# Patient Record
Sex: MALE | NOT HISPANIC OR LATINO | URBAN - METROPOLITAN AREA
[De-identification: names, ages, dates, MRNs, and addresses within clinical notes are randomized per-mention and may not be internally consistent; named-entity substitution may affect disease eponyms.]

---

## 2017-01-25 ENCOUNTER — INPATIENT (INPATIENT)
Facility: HOSPITAL | Age: 52
LOS: 0 days | Discharge: LEFT BEFORE DISCHARGE | DRG: 287 | End: 2017-01-26
Attending: INTERNAL MEDICINE | Admitting: INTERNAL MEDICINE
Payer: MEDICAID

## 2017-01-25 VITALS
SYSTOLIC BLOOD PRESSURE: 189 MMHG | DIASTOLIC BLOOD PRESSURE: 94 MMHG | WEIGHT: 248.9 LBS | TEMPERATURE: 98 F | OXYGEN SATURATION: 100 % | HEART RATE: 95 BPM | RESPIRATION RATE: 19 BRPM | HEIGHT: 68 IN

## 2017-01-25 PROCEDURE — 93010 ELECTROCARDIOGRAM REPORT: CPT

## 2017-01-25 PROCEDURE — 71020: CPT | Mod: 26

## 2017-01-25 PROCEDURE — 99285 EMERGENCY DEPT VISIT HI MDM: CPT | Mod: 25

## 2017-01-25 RX ORDER — CLOPIDOGREL BISULFATE 75 MG/1
600 TABLET, FILM COATED ORAL ONCE
Qty: 0 | Refills: 0 | Status: COMPLETED | OUTPATIENT
Start: 2017-01-25 | End: 2017-01-25

## 2017-01-25 RX ORDER — ASPIRIN/CALCIUM CARB/MAGNESIUM 324 MG
325 TABLET ORAL ONCE
Qty: 0 | Refills: 0 | Status: COMPLETED | OUTPATIENT
Start: 2017-01-25 | End: 2017-01-25

## 2017-01-25 RX ORDER — SODIUM CHLORIDE 9 MG/ML
1000 INJECTION INTRAMUSCULAR; INTRAVENOUS; SUBCUTANEOUS
Qty: 0 | Refills: 0 | Status: DISCONTINUED | OUTPATIENT
Start: 2017-01-25 | End: 2017-01-26

## 2017-01-25 RX ADMIN — CLOPIDOGREL BISULFATE 600 MILLIGRAM(S): 75 TABLET, FILM COATED ORAL at 23:51

## 2017-01-25 RX ADMIN — Medication 325 MILLIGRAM(S): at 23:51

## 2017-01-25 RX ADMIN — SODIUM CHLORIDE 75 MILLILITER(S): 9 INJECTION INTRAMUSCULAR; INTRAVENOUS; SUBCUTANEOUS at 23:52

## 2017-01-25 NOTE — ED ADULT TRIAGE NOTE - CHIEF COMPLAINT QUOTE
as per pt he has been having mid sternal  Chest pain since yesterday. pt stated that the chest pain is accompanied by nausea

## 2017-01-25 NOTE — ED ADULT NURSE NOTE - OBJECTIVE STATEMENT
52 y/o male with hx of HTN, DM, and hyperlipidemia arrived to [facility] ER reporting chest pain radiating to left arm intermittently for the past several weeks. Pt verbalized experiencing periods of nausea but no vomiting and diarrhea. Pt denies headache, blurred vision , slurred speech, palpitations, vomiting, diarrhea, fever, chills, LOC, SOB, weakness, fatigue, recent travel, recent injury. Upon assessment, abdomen soft and nontender, lung fields WNL, breathing is equal and unlabored, pulses palpable. EKG monitoring in progress. Awaiting disposition

## 2017-01-25 NOTE — ED PROVIDER NOTE - PROGRESS NOTE DETAILS
d/w primary cardiologist Dr. Hall [phone], abnormal exercise stress test yesterday w/ ST depression in II, III, aVF, 4, 5, ~10 min to return to baseline d/w Dr. Malloy, agree pt needs further cardiac care, will admit, asa 325 and plavix 600, admit for further evaluation/management

## 2017-01-25 NOTE — ED PROVIDER NOTE - PHYSICAL EXAMINATION
CON: ao x 3, HENMT: clear oropharynx, soft neck, HEAD: atraumatic, CV: rrr, equal pulses b/l, RESP: cta b/l, GI: +BS, soft, nontender, no rebound, no guarding, SKIN: no rash, MSK: moving all extremities spontaneously, NEURO: nonfocal

## 2017-01-25 NOTE — ED PROVIDER NOTE - MEDICAL DECISION MAKING DETAILS
intermittent cp, dm/htn/hl, smoker, after discussing w/ primary cardiology, found to have abnormal stress test, will d/w oncall cardiology

## 2017-01-25 NOTE — ED PROVIDER NOTE - OBJECTIVE STATEMENT
51 yom pw intermittent cp, across ant chest, ~1-2 min, rad to L arm, nonexertional, no modifying factors, no prior CAD.  last episode of cp 30 min PTA.

## 2017-01-26 VITALS
SYSTOLIC BLOOD PRESSURE: 168 MMHG | RESPIRATION RATE: 22 BRPM | TEMPERATURE: 98 F | OXYGEN SATURATION: 95 % | DIASTOLIC BLOOD PRESSURE: 90 MMHG

## 2017-01-26 DIAGNOSIS — R07.9 CHEST PAIN, UNSPECIFIED: ICD-10-CM

## 2017-01-26 DIAGNOSIS — E11.9 TYPE 2 DIABETES MELLITUS WITHOUT COMPLICATIONS: ICD-10-CM

## 2017-01-26 DIAGNOSIS — Z29.9 ENCOUNTER FOR PROPHYLACTIC MEASURES, UNSPECIFIED: ICD-10-CM

## 2017-01-26 DIAGNOSIS — E78.5 HYPERLIPIDEMIA, UNSPECIFIED: ICD-10-CM

## 2017-01-26 DIAGNOSIS — G47.33 OBSTRUCTIVE SLEEP APNEA (ADULT) (PEDIATRIC): ICD-10-CM

## 2017-01-26 DIAGNOSIS — Z41.8 ENCOUNTER FOR OTHER PROCEDURES FOR PURPOSES OTHER THAN REMEDYING HEALTH STATE: ICD-10-CM

## 2017-01-26 DIAGNOSIS — E78.2 MIXED HYPERLIPIDEMIA: ICD-10-CM

## 2017-01-26 DIAGNOSIS — I10 ESSENTIAL (PRIMARY) HYPERTENSION: ICD-10-CM

## 2017-01-26 DIAGNOSIS — E11.8 TYPE 2 DIABETES MELLITUS WITH UNSPECIFIED COMPLICATIONS: ICD-10-CM

## 2017-01-26 DIAGNOSIS — R63.8 OTHER SYMPTOMS AND SIGNS CONCERNING FOOD AND FLUID INTAKE: ICD-10-CM

## 2017-01-26 DIAGNOSIS — I24.9 ACUTE ISCHEMIC HEART DISEASE, UNSPECIFIED: ICD-10-CM

## 2017-01-26 LAB
ALBUMIN SERPL ELPH-MCNC: 3.6 G/DL — SIGNIFICANT CHANGE UP (ref 3.4–5)
ALP SERPL-CCNC: 91 U/L — SIGNIFICANT CHANGE UP (ref 40–120)
ALT FLD-CCNC: 31 U/L — SIGNIFICANT CHANGE UP (ref 12–42)
ANION GAP SERPL CALC-SCNC: 7 MMOL/L — LOW (ref 9–16)
APTT BLD: 37.3 SEC — SIGNIFICANT CHANGE UP (ref 27.5–37.4)
APTT BLD: 43.2 SEC — HIGH (ref 27.5–37.4)
AST SERPL-CCNC: 15 U/L — SIGNIFICANT CHANGE UP (ref 15–37)
BILIRUB SERPL-MCNC: 0.7 MG/DL — SIGNIFICANT CHANGE UP (ref 0.2–1.2)
BLD GP AB SCN SERPL QL: NEGATIVE — SIGNIFICANT CHANGE UP
BUN SERPL-MCNC: 17 MG/DL — SIGNIFICANT CHANGE UP (ref 7–23)
CALCIUM SERPL-MCNC: 8.6 MG/DL — SIGNIFICANT CHANGE UP (ref 8.5–10.5)
CHLORIDE SERPL-SCNC: 104 MMOL/L — SIGNIFICANT CHANGE UP (ref 96–108)
CHOLEST SERPL-MCNC: 207 MG/DL — HIGH
CK MB CFR SERPL CALC: 1.2 NG/ML — SIGNIFICANT CHANGE UP (ref 0.5–3.6)
CK MB CFR SERPL CALC: 1.4 NG/ML — SIGNIFICANT CHANGE UP (ref 0.5–3.6)
CK SERPL-CCNC: 138 U/L — SIGNIFICANT CHANGE UP (ref 39–308)
CK SERPL-CCNC: 147 U/L — SIGNIFICANT CHANGE UP (ref 39–308)
CO2 SERPL-SCNC: 27 MMOL/L — SIGNIFICANT CHANGE UP (ref 22–31)
CREAT SERPL-MCNC: 0.69 MG/DL — SIGNIFICANT CHANGE UP (ref 0.5–1.3)
GLUCOSE SERPL-MCNC: 208 MG/DL — HIGH (ref 70–99)
HBA1C BLD-MCNC: 8.3 % — HIGH (ref 4.8–5.6)
HCT VFR BLD CALC: 42.2 % — SIGNIFICANT CHANGE UP (ref 39–50)
HCV AB S/CO SERPL IA: 0.2 S/CO — SIGNIFICANT CHANGE UP
HCV AB SERPL-IMP: SIGNIFICANT CHANGE UP
HDLC SERPL-MCNC: 29 MG/DL — LOW
HGB BLD-MCNC: 14.8 G/DL — SIGNIFICANT CHANGE UP (ref 13–17)
INR BLD: 1.02 — SIGNIFICANT CHANGE UP (ref 0.88–1.16)
LIPID PNL WITH DIRECT LDL SERPL: 111 MG/DL — HIGH
MAGNESIUM SERPL-MCNC: 1.8 MG/DL — SIGNIFICANT CHANGE UP (ref 1.6–2.4)
MCHC RBC-ENTMCNC: 30 PG — SIGNIFICANT CHANGE UP (ref 27–34)
MCHC RBC-ENTMCNC: 35.1 G/DL — SIGNIFICANT CHANGE UP (ref 32–36)
MCV RBC AUTO: 85.6 FL — SIGNIFICANT CHANGE UP (ref 80–100)
PLATELET # BLD AUTO: 216 K/UL — SIGNIFICANT CHANGE UP (ref 150–400)
POTASSIUM SERPL-MCNC: 3.7 MMOL/L — SIGNIFICANT CHANGE UP (ref 3.5–5.3)
POTASSIUM SERPL-SCNC: 3.7 MMOL/L — SIGNIFICANT CHANGE UP (ref 3.5–5.3)
PROT SERPL-MCNC: 7.3 G/DL — SIGNIFICANT CHANGE UP (ref 6.4–8.2)
PROTHROM AB SERPL-ACNC: 11.3 SEC — SIGNIFICANT CHANGE UP (ref 10–13.1)
RBC # BLD: 4.93 M/UL — SIGNIFICANT CHANGE UP (ref 4.2–5.8)
RBC # FLD: 12.6 % — SIGNIFICANT CHANGE UP (ref 10.3–16.9)
RH IG SCN BLD-IMP: POSITIVE — SIGNIFICANT CHANGE UP
SODIUM SERPL-SCNC: 138 MMOL/L — SIGNIFICANT CHANGE UP (ref 135–145)
TOTAL CHOLESTEROL/HDL RATIO MEASUREMENT: 7.1 RATIO — HIGH
TRIGL SERPL-MCNC: 335 MG/DL — HIGH
TROPONIN I SERPL-MCNC: <0.015 NG/ML — SIGNIFICANT CHANGE UP (ref 0.01–0.04)
TROPONIN I SERPL-MCNC: <0.015 NG/ML — SIGNIFICANT CHANGE UP (ref 0.01–0.04)
TSH SERPL-MCNC: 1.41 UIU/ML — SIGNIFICANT CHANGE UP (ref 0.35–4.94)
WBC # BLD: 5.9 K/UL — SIGNIFICANT CHANGE UP (ref 3.8–10.5)
WBC # FLD AUTO: 5.9 K/UL — SIGNIFICANT CHANGE UP (ref 3.8–10.5)

## 2017-01-26 PROCEDURE — 93010 ELECTROCARDIOGRAM REPORT: CPT

## 2017-01-26 PROCEDURE — 84484 ASSAY OF TROPONIN QUANT: CPT

## 2017-01-26 PROCEDURE — C1769: CPT

## 2017-01-26 PROCEDURE — 83735 ASSAY OF MAGNESIUM: CPT

## 2017-01-26 PROCEDURE — 99285 EMERGENCY DEPT VISIT HI MDM: CPT | Mod: 25

## 2017-01-26 PROCEDURE — C1887: CPT

## 2017-01-26 PROCEDURE — 71046 X-RAY EXAM CHEST 2 VIEWS: CPT

## 2017-01-26 PROCEDURE — 86901 BLOOD TYPING SEROLOGIC RH(D): CPT

## 2017-01-26 PROCEDURE — 85610 PROTHROMBIN TIME: CPT

## 2017-01-26 PROCEDURE — 86850 RBC ANTIBODY SCREEN: CPT

## 2017-01-26 PROCEDURE — 93458 L HRT ARTERY/VENTRICLE ANGIO: CPT | Mod: 26

## 2017-01-26 PROCEDURE — 93005 ELECTROCARDIOGRAM TRACING: CPT

## 2017-01-26 PROCEDURE — 85025 COMPLETE CBC W/AUTO DIFF WBC: CPT

## 2017-01-26 PROCEDURE — 84443 ASSAY THYROID STIM HORMONE: CPT

## 2017-01-26 PROCEDURE — 86803 HEPATITIS C AB TEST: CPT

## 2017-01-26 PROCEDURE — 36415 COLL VENOUS BLD VENIPUNCTURE: CPT

## 2017-01-26 PROCEDURE — 82550 ASSAY OF CK (CPK): CPT

## 2017-01-26 PROCEDURE — 80061 LIPID PANEL: CPT

## 2017-01-26 PROCEDURE — 86900 BLOOD TYPING SEROLOGIC ABO: CPT

## 2017-01-26 PROCEDURE — 80053 COMPREHEN METABOLIC PANEL: CPT

## 2017-01-26 PROCEDURE — 82553 CREATINE MB FRACTION: CPT

## 2017-01-26 PROCEDURE — 85027 COMPLETE CBC AUTOMATED: CPT

## 2017-01-26 PROCEDURE — 85730 THROMBOPLASTIN TIME PARTIAL: CPT

## 2017-01-26 PROCEDURE — 83036 HEMOGLOBIN GLYCOSYLATED A1C: CPT

## 2017-01-26 RX ORDER — AMLODIPINE BESYLATE 2.5 MG/1
5 TABLET ORAL DAILY
Qty: 0 | Refills: 0 | Status: DISCONTINUED | OUTPATIENT
Start: 2017-01-26 | End: 2017-01-26

## 2017-01-26 RX ORDER — AMLODIPINE BESYLATE 2.5 MG/1
1 TABLET ORAL
Qty: 0 | Refills: 0 | COMMUNITY

## 2017-01-26 RX ORDER — CLOPIDOGREL BISULFATE 75 MG/1
75 TABLET, FILM COATED ORAL DAILY
Qty: 0 | Refills: 0 | Status: DISCONTINUED | OUTPATIENT
Start: 2017-01-26 | End: 2017-01-26

## 2017-01-26 RX ORDER — HEPARIN SODIUM 5000 [USP'U]/ML
1000 INJECTION INTRAVENOUS; SUBCUTANEOUS
Qty: 25000 | Refills: 0 | Status: DISCONTINUED | OUTPATIENT
Start: 2017-01-26 | End: 2017-01-26

## 2017-01-26 RX ORDER — ATORVASTATIN CALCIUM 80 MG/1
80 TABLET, FILM COATED ORAL AT BEDTIME
Qty: 0 | Refills: 0 | Status: DISCONTINUED | OUTPATIENT
Start: 2017-01-26 | End: 2017-01-26

## 2017-01-26 RX ORDER — HEPARIN SODIUM 5000 [USP'U]/ML
INJECTION INTRAVENOUS; SUBCUTANEOUS
Qty: 25000 | Refills: 0 | Status: DISCONTINUED | OUTPATIENT
Start: 2017-01-26 | End: 2017-01-26

## 2017-01-26 RX ORDER — FINASTERIDE 5 MG/1
5 TABLET, FILM COATED ORAL DAILY
Qty: 0 | Refills: 0 | Status: DISCONTINUED | OUTPATIENT
Start: 2017-01-26 | End: 2017-01-26

## 2017-01-26 RX ORDER — METOPROLOL TARTRATE 50 MG
12.5 TABLET ORAL EVERY 12 HOURS
Qty: 0 | Refills: 0 | Status: DISCONTINUED | OUTPATIENT
Start: 2017-01-26 | End: 2017-01-26

## 2017-01-26 RX ORDER — POTASSIUM CHLORIDE 20 MEQ
40 PACKET (EA) ORAL ONCE
Qty: 0 | Refills: 0 | Status: COMPLETED | OUTPATIENT
Start: 2017-01-26 | End: 2017-01-26

## 2017-01-26 RX ORDER — ASPIRIN/CALCIUM CARB/MAGNESIUM 324 MG
81 TABLET ORAL DAILY
Qty: 0 | Refills: 0 | Status: DISCONTINUED | OUTPATIENT
Start: 2017-01-26 | End: 2017-01-26

## 2017-01-26 RX ORDER — SIMVASTATIN 20 MG/1
1 TABLET, FILM COATED ORAL
Qty: 0 | Refills: 0 | COMMUNITY

## 2017-01-26 RX ORDER — METFORMIN HYDROCHLORIDE 850 MG/1
1 TABLET ORAL
Qty: 0 | Refills: 0 | COMMUNITY

## 2017-01-26 RX ORDER — AMLODIPINE BESYLATE 2.5 MG/1
5 TABLET ORAL ONCE
Qty: 0 | Refills: 0 | Status: COMPLETED | OUTPATIENT
Start: 2017-01-26 | End: 2017-01-26

## 2017-01-26 RX ORDER — LOSARTAN POTASSIUM 100 MG/1
100 TABLET, FILM COATED ORAL DAILY
Qty: 0 | Refills: 0 | Status: DISCONTINUED | OUTPATIENT
Start: 2017-01-26 | End: 2017-01-26

## 2017-01-26 RX ORDER — ASPIRIN/CALCIUM CARB/MAGNESIUM 324 MG
1 TABLET ORAL
Qty: 0 | Refills: 0 | COMMUNITY

## 2017-01-26 RX ORDER — FINASTERIDE 5 MG/1
1 TABLET, FILM COATED ORAL
Qty: 0 | Refills: 0 | COMMUNITY

## 2017-01-26 RX ORDER — INSULIN LISPRO 100/ML
VIAL (ML) SUBCUTANEOUS
Qty: 0 | Refills: 0 | Status: DISCONTINUED | OUTPATIENT
Start: 2017-01-26 | End: 2017-01-26

## 2017-01-26 RX ADMIN — Medication 40 MILLIEQUIVALENT(S): at 01:58

## 2017-01-26 RX ADMIN — Medication 2: at 13:35

## 2017-01-26 RX ADMIN — Medication 12.5 MILLIGRAM(S): at 14:50

## 2017-01-26 RX ADMIN — LOSARTAN POTASSIUM 100 MILLIGRAM(S): 100 TABLET, FILM COATED ORAL at 01:57

## 2017-01-26 RX ADMIN — CLOPIDOGREL BISULFATE 75 MILLIGRAM(S): 75 TABLET, FILM COATED ORAL at 15:28

## 2017-01-26 RX ADMIN — AMLODIPINE BESYLATE 5 MILLIGRAM(S): 2.5 TABLET ORAL at 00:12

## 2017-01-26 RX ADMIN — Medication 4: at 15:37

## 2017-01-26 RX ADMIN — Medication 81 MILLIGRAM(S): at 13:35

## 2017-01-26 RX ADMIN — FINASTERIDE 5 MILLIGRAM(S): 5 TABLET, FILM COATED ORAL at 13:34

## 2017-01-26 RX ADMIN — Medication 40 MILLIEQUIVALENT(S): at 09:04

## 2017-01-26 RX ADMIN — Medication 4: at 07:59

## 2017-01-26 NOTE — PROGRESS NOTE ADULT - ASSESSMENT
51M with PMHx of HTN, HLD, DMII, JACUQELINE, heavy smoker presenting with symptoms consistent with unstable angina with new onset chest pain at rest and exertion, significant risk factors for CAD, positive stress test as outpatient.

## 2017-01-26 NOTE — H&P ADULT. - PROBLEM SELECTOR PLAN 3
Home medications: Hyzaar 100mg/12.5mg, Norvasc 5mg  Hypertensive on admission to systolic 170s-190s, no signs of end organ damage at this time- s/p Norvasc 5mg in the ED  Will give home medication of Losartan 100mg  Monitor BP

## 2017-01-26 NOTE — CHART NOTE - NSCHARTNOTEFT_GEN_A_CORE
Called by Nurse at 6:30PM that patient was angry and was leaving the hospital. Went to speak with patient who reported that he had previously asked the nurse for a cup of tea which he never received and therefore he was leaving. The AMA discharge was completed and the patient left the hospital. He calmed down downstairs and then returned to the unit. Called by Nurse at 6:30PM that patient was angry and was leaving the hospital. Went to speak with patient who reported that he had previously asked the nurse for a cup of tea which he never received and therefore he was leaving. The AMA discharge was completed and the patient left the hospital. He calmed down downstairs and his mother returned to the unit to ask if he could come back to his room. Dr. Malloy was contacted and explained that if he left the hospital AMA then he would have to be readmitted through the ED. The family left and the patient did not return.

## 2017-01-26 NOTE — H&P ADULT. - FAMILY HISTORY
Sibling  Still living? Unknown  Family history of MI (myocardial infarction), Age at diagnosis: Age Unknown

## 2017-01-26 NOTE — H&P ADULT. - PMH
BPH (benign prostatic hyperplasia)    Diabetes mellitus    Hyperlipidemia    Hypertension    JACQUELINE (obstructive sleep apnea) BPH (benign prostatic hyperplasia)    Diabetes mellitus    Hyperlipidemia    Hypertension    Nephrolithiasis    JACQUELINE (obstructive sleep apnea)

## 2017-01-26 NOTE — PROGRESS NOTE ADULT - SUBJECTIVE AND OBJECTIVE BOX
INTERVAL HPI/OVERNIGHT EVENTS:  Admitted overnight. ELIZABETH. No chest pain, SOB, cough. Does not want nicotine patch.    VITAL SIGNS:  T(F): 96.6  HR: 67  BP: 160/86  RR: 22  SpO2: 97%  Wt(kg): --    PHYSICAL EXAM:    Constitutional: appears well, sitting up in bed  Eyes: PERRL, EOMI  ENMT: MMM  Neck: supple, no JVD  Respiratory: normal WOB, CTAB  Cardiovascular: RRR, no MRG  Gastrointestinal: soft, NTND, normal BS  Extremities: no edema, warm  Vascular: pulses intact in 4 extremities  Neurological: A&Ox3, moves all extremities    MEDICATIONS  (STANDING):  insulin lispro (HumaLOG) corrective regimen sliding scale  SubCutaneous Before meals and at bedtime  losartan 100milliGRAM(s) Oral daily  finasteride 5milliGRAM(s) Oral daily  aspirin enteric coated 81milliGRAM(s) Oral daily  clopidogrel Tablet 75milliGRAM(s) Oral daily  atorvastatin 80milliGRAM(s) Oral at bedtime  amLODIPine   Tablet 5milliGRAM(s) Oral daily  heparin  Infusion 1000Unit(s)/Hr IV Continuous <Continuous>    MEDICATIONS  (PRN):      Allergies    No Known Allergies    Intolerances        LABS:                        14.8   5.9   )-----------( 216      ( 26 Jan 2017 07:02 )             42.2     26 Jan 2017 07:02    138    |  104    |  17     ----------------------------<  208    3.7     |  27     |  0.69     Ca    8.6        26 Jan 2017 07:02  Mg     1.8       26 Jan 2017 07:02    TPro  7.3    /  Alb  3.6    /  TBili  0.7    /  DBili  x      /  AST  15     /  ALT  31     /  AlkPhos  91     26 Jan 2017 07:02    PT/INR - ( 26 Jan 2017 07:02 )   PT: 11.3 sec;   INR: 1.02          PTT - ( 26 Jan 2017 07:02 )  PTT:37.3 sec      RADIOLOGY & ADDITIONAL TESTS:

## 2017-01-26 NOTE — H&P ADULT. - HISTORY OF PRESENT ILLNESS
Patient is a 51 year old male, PMHx HTN, HLD, current smoker (3 ppd), DMII, nephrolithiasis, presenting with chest pain.  Patient states that for the past two weeks he has been experiencing multiple episodes of chest pain. The pain is sharp and  bandlike across his upper chest; radiates to the left arm.  It is not associated with nausea or diaphoresis.  The pain usually lasts for 1-2 minutes and resolves on its own. It occurs at rest and on exertion. Of note, the patient had an outpatient stress test yesterday, and was found to have ST depressions in II/III/avF/4-6.  It took ten minutes for the EKG to return to baseline.   Patient came to the ED because he had an episode of chest pain this evening.  The pain resolved in the emergency room without intervention. At baseline, he gets short of breath after walking ~2 block-this is chronic.  Over the past 6 months he has taken multiple trips by plane/car to [place]. No recent illnesses or hospitalizations. Patient is a 51 year old male, PMHx HTN, HLD, current smoker (3 ppd), DMII, nephrolithiasis, JACQUELINE, presenting with chest pain.  Patient states that for the past two weeks he has been experiencing multiple episodes of chest pain. The pain is sharp and  bandlike across his upper chest; radiates to the left arm.  It is not associated with nausea or diaphoresis.  The pain usually lasts for 1-2 minutes and resolves on its own. It occurs at rest and on exertion. Of note, the patient had an outpatient stress test yesterday, and was found to have ST depressions in II/III/avF/4-6.  It took ten minutes for the EKG to return to baseline.   Patient came to the ED because he had an episode of chest pain this evening.  The pain resolved in the emergency room without intervention. At baseline, he gets short of breath after walking ~2 block-this is chronic.  Over the past 6 months he has taken multiple trips by plane/car to [place]. No recent illnesses or hospitalizations. Patient is a 51 year old male, PMHx HTN, HLD, current smoker (3 ppd), DMII, nephrolithiasis, JACQUELINE, presenting with chest pain.  Patient states that for the past two weeks he has been experiencing multiple episodes of chest pain. The pain is sharp and  bandlike across his upper chest; radiates to the left arm. Sometimes associated with  nausea, no diaphoresis.  The pain usually lasts for 1-2 minutes and resolves on its own. It occurs at rest and on exertion. Of note, the patient had an outpatient stress test yesterday, and was found to have ST depressions in II/III/avF/4-6.  It took ten minutes for the EKG to return to baseline.   Patient came to the ED because he had an episode of chest pain this evening.  The pain resolved in the emergency room without intervention. At baseline, he gets short of breath after walking ~2 block-this is chronic.  Over the past 6 months he has taken multiple trips by plane/car to [place]. No recent illnesses or hospitalizations.

## 2017-01-26 NOTE — PROGRESS NOTE ADULT - SUBJECTIVE AND OBJECTIVE BOX
Interventional Cardiology PA Precath Note      HPI:  52 y/o male current smoker, pmHx HTN, HLD, DMII, nephrolithiasis, JACQUELINE (was on bipap, needs to go back on it) presented to ED (1/25/17) complaining of 5/10 sharp/bandlike CP radiating to the left arm with associated nausea. Patient states that for the past two weeks he has been experiencing multiple episodes of chest pain that last 1-2 minutes occurring at rest and exertion. Pt had outpt stress test  (1/24/17) at Dr Bullock's office which revealed ST depressions in II/III/avF/4-6 and resolved after 10 minutes of rest. Pt was prompted to come to the ED after CP (1/25/17) that resolved in the ED without intervention. Pt was loaded with ASA 325mg and Plavix 600mg (1/25/17), pt started on heparin gtt likely in the setting of unstable angina, and vital signs are currently stable. Currently, denies CP, SOB, diaphoresis, LE swelling, LE pain, palpitations, dizziness, N/V/D, recent sick contacts, fevers/chills.  In light of  current cardiac risk factors and unstable angina, pt is to undergo left heart cardiac catheterization with Dr Moyer today.          PMH:  as above  PSH:  none  ALL: NKDA, NKFA. Denies shellfish/Contrast dye allergy.  SocHX: TOB (3 packs per day)  FHx: mother possible MI, brother 63 (possible AAA, pt states "aorta is what caused my brother to pass away")  MEDS:   insulin lispro (HumaLOG) corrective regimen sliding scale  SubCutaneous Before meals and at bedtime  losartan 100milliGRAM(s) Oral daily  finasteride 5milliGRAM(s) Oral daily  aspirin enteric coated 81milliGRAM(s) Oral daily  clopidogrel Tablet 75milliGRAM(s) Oral daily  atorvastatin 80milliGRAM(s) Oral at bedtime  amLODIPine   Tablet 5milliGRAM(s) Oral daily  heparin  Infusion 1000Unit(s)/Hr IV Continuous <Continuous>    T(C): 35.9, Max: 36.9 (01-25 @ 23:38)  HR: 72 (67 - 95)  BP: 155/92 (154/81 - 195/111)  RR: 18 (18 - 22)  SpO2: 97% (96% - 100%)  Wt(kg): --  HEENT: NCAT, EOMI, PERRLA  NECK: No JVD, No carotid bruits B/L, +2 Carotid pulses B/L  PULM:  CTA B/L No W/R/R  CARD: RRR, +S1, +S2, No M/R/G  ABD: ND, +BS, NT, no masses  EXT: Warm, pedal edema yes/no, pitting/non-pitting  RECTAL: Guaiac negative/positive   NEURO: A & O x 3, no focal neurologic deficits  PULSES	    R	      FEM         	                                            DP                          PT  Right		     +2             +1                                No     Bruit	+2                             +2  Left		      +2              +1                              No     Bruit           +2                              +2                                14.8   5.9   )-----------( 216      ( 26 Jan 2017 07:02 )             42.2     26 Jan 2017 07:02    138    |  104    |  17     ----------------------------<  208    3.7     |  27     |  0.69     Ca    8.6        26 Jan 2017 07:02  Mg     1.8       26 Jan 2017 07:02    TPro  7.3    /  Alb  3.6    /  TBili  0.7    /  DBili  x      /  AST  15     /  ALT  31     /  AlkPhos  91     26 Jan 2017 07:02    CARDIAC MARKERS ( 26 Jan 2017 07:02 )  <0.015 ng/mL / x     / 138 U/L / x     / 1.2 ng/mL  CARDIAC MARKERS ( 25 Jan 2017 23:05 )  <0.015 ng/mL / x     / 154 U/L / x     / 1.2 ng/mL        EKG:					  ASA __2___				Mallampati class: ____3_____	            Anginal Class: ______2___    A/P:    52 y/o male current smoker, pmHx HTN, HLD, DMII, nephrolithiasis, JACQUELINE (was on bipap, needs to go back on it) presented to ED (1/25/17) complaining of 5/10 sharp/bandlike CP radiating to the left arm with associated nausea. Patient states that for the past two weeks he has been experiencing multiple episodes of chest pain that last 1-2 minutes occurring at rest and exertion. In light of patients risk factors and unstable angina, pt is precathed/consented for C with Dr Moyer (1/26/17). Pt loaded with ASA 325mg and Plavix 600mg. stop heparin gtt 1 hour before cath      Sedation Plan:   Moderate   Patient Is Suitable Candidate For Sedation Yes      Risks & benefits of procedure and sedation and risks and benefits for the alternative therapy have been explained to the patient in layman’s terms including but not limited to: allergic reaction, bleeding, infection, arrhythmia, respiratory compromise, renal and vascular compromise, limb damage, MI, CVA, emergent CABG/Vascular Surgery and death. Informed consent obtained and in chart.

## 2017-01-26 NOTE — PROGRESS NOTE ADULT - PROBLEM SELECTOR PLAN 1
Symptoms consistent with unstable angina. MARIANNA score 3. Loaded with ASA, Plavix in ED. Outpatient stress test with ST depressions in II, III, aVF, V3-6  - Plan for left heart cath today with possible intervention  - c/w heparin drip given MARIANNA of 3, will be d/c prior to cath  - c/w Asa 81mg and Plavix 75mg qd  - Lipitor 80mg qhs  - c/w home cozaar 100mg qd  - Will discuss addition of BB after cath

## 2017-01-26 NOTE — H&P ADULT. - PROBLEM SELECTOR PLAN 1
Patient with two weeks of intermittent chest pain at rest and exertion, now s/p outpatient abnormal stress test.  EKG normal sinus rhythm with no ST changes on admission, first set of cardiac enzymes negative. Pain likely secondary to unstable angina. Even though patient recently has been on multiple long trips by plane/car, Well's score 0 for DVT/PE, no LE edema or asymmetric swelling, no hypoxia or tachycardia.  Admitted for unstable angina and cath tomorrow.  s/p ASA 325mg x1 and Plavis 600mg x1 in ED  Continue ASA 81mg daily, Plavis 75mg daily  Order AM EKG, echo  Cath pending Patient with two weeks of intermittent chest pain at rest and exertion, now s/p outpatient abnormal stress test.  EKG normal sinus rhythm with no ST changes on admission, first set of cardiac enzymes negative. Pain likely secondary to unstable angina. Even though patient recently has been on multiple long trips by plane/car, Well's score 0 for DVT/PE, no LE edema or asymmetric swelling, no hypoxia or tachycardia.  Admitted for unstable angina and cath tomorrow.  s/p ASA 325mg x1 and Plavis 600mg x1 in ED  Continue ASA 81mg daily, Plavis 75mg daily, will start heparin gtt  Order AM EKG, echo  Cath pending Patient with two weeks of intermittent chest pain at rest and exertion, now s/p outpatient abnormal stress test.  EKG normal sinus rhythm with no ST changes on admission, first set of cardiac enzymes negative. Pain likely secondary to unstable angina. Even though patient recently has been on multiple long trips by plane/car, Well's score 0 for DVT/PE, no LE edema or asymmetric swelling, no hypoxia or tachycardia.  Admitted for unstable angina and cath tomorrow.  s/p ASA 325mg x1 and Plavis 600mg x1 in ED  Continue ASA 81mg daily, Plavis 75mg daily, will start heparin gtt  Order AM EKG, echo  Cath pending  check ddimer

## 2017-01-26 NOTE — H&P ADULT. - ASSESSMENT
Patient is a 51 year old male, PMHx HTN, HLD, JACQUELINE, DMII, presenting with 2 weeks of intermittent chest pain with exertion and at rest, s/p outpatient abnormal stress test one day ago.

## 2017-01-30 DIAGNOSIS — I20.0 UNSTABLE ANGINA: ICD-10-CM

## 2017-01-30 DIAGNOSIS — Z79.82 LONG TERM (CURRENT) USE OF ASPIRIN: ICD-10-CM

## 2017-01-30 DIAGNOSIS — E11.9 TYPE 2 DIABETES MELLITUS WITHOUT COMPLICATIONS: ICD-10-CM

## 2017-01-30 DIAGNOSIS — E78.2 MIXED HYPERLIPIDEMIA: ICD-10-CM

## 2017-01-30 DIAGNOSIS — Z82.49 FAMILY HISTORY OF ISCHEMIC HEART DISEASE AND OTHER DISEASES OF THE CIRCULATORY SYSTEM: ICD-10-CM

## 2017-01-30 DIAGNOSIS — R94.39 ABNORMAL RESULT OF OTHER CARDIOVASCULAR FUNCTION STUDY: ICD-10-CM

## 2017-01-30 DIAGNOSIS — G47.33 OBSTRUCTIVE SLEEP APNEA (ADULT) (PEDIATRIC): ICD-10-CM

## 2017-01-30 DIAGNOSIS — Z79.84 LONG TERM (CURRENT) USE OF ORAL HYPOGLYCEMIC DRUGS: ICD-10-CM

## 2017-01-30 DIAGNOSIS — F17.210 NICOTINE DEPENDENCE, CIGARETTES, UNCOMPLICATED: ICD-10-CM

## 2017-01-30 DIAGNOSIS — R07.9 CHEST PAIN, UNSPECIFIED: ICD-10-CM

## 2017-01-30 DIAGNOSIS — I10 ESSENTIAL (PRIMARY) HYPERTENSION: ICD-10-CM

## 2017-01-30 DIAGNOSIS — N40.0 BENIGN PROSTATIC HYPERPLASIA WITHOUT LOWER URINARY TRACT SYMPTOMS: ICD-10-CM

## 2017-10-26 PROBLEM — E11.9 TYPE 2 DIABETES MELLITUS WITHOUT COMPLICATIONS: Chronic | Status: ACTIVE | Noted: 2017-01-25

## 2017-10-26 PROBLEM — N20.0 CALCULUS OF KIDNEY: Chronic | Status: ACTIVE | Noted: 2017-01-26

## 2017-10-26 PROBLEM — E78.5 HYPERLIPIDEMIA, UNSPECIFIED: Chronic | Status: ACTIVE | Noted: 2017-01-25

## 2017-10-26 PROBLEM — G47.33 OBSTRUCTIVE SLEEP APNEA (ADULT) (PEDIATRIC): Chronic | Status: ACTIVE | Noted: 2017-01-26

## 2017-10-26 PROBLEM — I10 ESSENTIAL (PRIMARY) HYPERTENSION: Chronic | Status: ACTIVE | Noted: 2017-01-25

## 2017-10-26 PROBLEM — N40.0 BENIGN PROSTATIC HYPERPLASIA WITHOUT LOWER URINARY TRACT SYMPTOMS: Chronic | Status: ACTIVE | Noted: 2017-01-26

## 2017-10-26 PROBLEM — Z00.00 ENCOUNTER FOR PREVENTIVE HEALTH EXAMINATION: Status: ACTIVE | Noted: 2017-10-26

## 2017-11-09 ENCOUNTER — APPOINTMENT (OUTPATIENT)
Dept: HEART AND VASCULAR | Facility: CLINIC | Age: 52
End: 2017-11-09

## 2021-07-15 NOTE — PATIENT PROFILE ADULT. - CENTRAL VENOUS CATHETER
Physical Therapy Initial Evaluation and Plan of Care      Patient: Wei Salgado   : 1966  Diagnosis/ICD-10 Code:  Lumbar spine painful on movement [M54.5]  Referring practitioner: No ref. provider found  Date of Initial Visit: 7/15/2021  Today's Date: 7/15/2021  Patient seen for 1 sessions           Subjective Evaluation    History of Present Illness  Date of surgery: 2021  Mechanism of injury: Standing tolerance 30 minutes. Walking limited due to limp, weakness in legs. Feels mostly in upper part of thighs.     Subjective comment: Back pain for over a year and down R>L legs. Did therapy here but wasn't getting better, got MRI, showed stenosis. Referred to neurologist. Last day took pain meds was Tuesday. Buttocks and lower back pain. Weakness in legs still some but much better than pre-surgery.   Patient Occupation: Owner of cabinet shop, has to stand for long times.    Precautions and Work Restrictions: Limited by 25lbs, trying not to overdo with standing and walking. Quality of life: good    Pain  Current pain ratin  At best pain ratin  At worst pain ratin  Relieving factors: rest and medications  Aggravating factors: standing and lifting  Progression: improved    Social Support  Lives in: multiple-level home  Lives with: spouse    Patient Goals  Patient goals for therapy: decreased pain, return to work, return to sport/leisure activities and independence with ADLs/IADLs  Patient goal: Likes to creek fish, golf           Objective          Palpation   Left   Hypertonic in the erector spinae, iliopsoas and lumbar paraspinals.     Right   Hypertonic in the erector spinae, iliopsoas and lumbar paraspinals.     Neurological Testing     Sensation     Lumbar   Left   Intact: light touch    Right   Intact: light touch    Active Range of Motion     Lumbar   Left rotation: WFL  Right rotation: WFL    Additional Active Range of Motion Details  Flexion limited 50% of WNL for age with  pain  Extension 60% with mild pain  Side bending 20% of WNL for page with rotation compensation    Strength/Myotome Testing     Lumbar   Left   Normal strength    Right   Normal strength    Tests     Lumbar     Left   Negative passive SLR.     Right   Negative passive SLR.     Lumbar Flexibility Comments:   Moderate to severely limited B hamstrings. Decreased B hip PROM        See Exercise, Manual, and Modality Logs for complete treatment.       Functional Outcome Score: LEFS 47/80        Assessment & Plan     Assessment  Impairments: abnormal coordination, abnormal or restricted ROM, lacks appropriate home exercise program and pain with function  Assessment details: Wei Salgado is a 55 y.o. year-old male referred to physical therapy for s/p L3-4 laminectomy and decompression. He presents with a evolving clinical presentation.  He will benefit from skilled physical therapy to address his lack of ROM, strength, functional mobility and help promote healthy return to work and exercise.   Prognosis: good  Functional Limitations: carrying objects, lifting, walking, pulling, pushing, uncomfortable because of pain and standing  Goals  Plan Goals: STGs to be met by 2 weeks  1.) Pt will be independent and compliant with initial home exercise program.   2.) Pt will report low back pain </= 5/10 to increase ease of standing to prepare a meal.  3.) Pt will be able to stand for one hour without rest break with low back pain </= 2-3/10.     LTGs to be met by 4 weeks  1.) Pt will be independent and compliant with advanced home exercise program.   2.) Pt will report low back and LE pain </= 3/10 to increase ease of performing work-related duties.  3.) Pt will score >/=57/80 on LEFS indicating decreased perceived functional disability.   4.) Pt will work full work day with minimal to no discomfort in low back or LE.       Plan  Therapy options: will be seen for skilled physical therapy services  Planned modality interventions:  cryotherapy, dry needling, high voltage pulsed current (pain management), hydrotherapy, TENS, thermotherapy (hydrocollator packs) and ultrasound  Planned therapy interventions: manual therapy, neuromuscular re-education, soft tissue mobilization, spinal/joint mobilization, strengthening, stretching, therapeutic activities, joint mobilization, IADL retraining, home exercise program, gait training, functional ROM exercises, flexibility, ADL retraining, abdominal trunk stabilization and postural training  Frequency: 2x week  Duration in visits: 12  Duration in weeks: 20  Treatment plan discussed with: patient  Plan details: Develop and progress core and LE strengthening, functional mobility, pain control, and activity endurance.         Timed:  Manual Therapy:         mins  47761;  Therapeutic Exercise:    38     mins  48722;     Neuromuscular Arielle:        mins  53824;    Therapeutic Activity:          mins  55016;     Gait Training:           mins  85656;     Ultrasound:          mins  16351;    Electrical Stimulation:         mins  11012 ( );  Iontophoresis         mins 13234  Dry Needling        mins      Untimed:  Electrical Stimulation:         mins  81747 ( );  Mechanical Traction:         mins  31541;     Timed Treatment:   38   mins   Total Treatment:     65   mins    PT SIGNATURE: Daisy Valentin, PT   DATE TREATMENT INITIATED: 7/15/2021    Initial Certification  Certification Period: 10/13/2021  I certify that the therapy services are furnished while this patient is under my care.  The services outlined above are required by this patient, and will be reviewed every 90 days.     PHYSICIAN:       DATE:     Please sign and return via fax to [phone] Thank you, [facility].         no

## 2023-01-18 NOTE — ED ADULT NURSE NOTE - NS PRO PASSIVE SMOKE EXP
Refused;     Disp Refills Start End    rosuvastatin (CRESTOR) 40 MG tablet 90 tablet 1 10/13/2022     Sig - Route: Take 1 tablet by mouth at bedtime. - Oral       Disp Refills Start End    lisinopril (ZESTRIL) 10 MG tablet 90 tablet 0 10/10/2022 10/10/2023    Sig - Route: Take 0.5 tablets by mouth daily. - Oral      NOT DUE FOR REFILLS   No

## 2024-11-27 ENCOUNTER — APPOINTMENT (OUTPATIENT)
Dept: OTOLARYNGOLOGY | Facility: CLINIC | Age: 59
End: 2024-11-27
Payer: MEDICAID

## 2024-11-27 VITALS
WEIGHT: 230 LBS | DIASTOLIC BLOOD PRESSURE: 89 MMHG | SYSTOLIC BLOOD PRESSURE: 149 MMHG | BODY MASS INDEX: 34.86 KG/M2 | HEART RATE: 69 BPM | HEIGHT: 68 IN

## 2024-11-27 DIAGNOSIS — H61.23 IMPACTED CERUMEN, BILATERAL: ICD-10-CM

## 2024-11-27 DIAGNOSIS — J34.2 DEVIATED NASAL SEPTUM: ICD-10-CM

## 2024-11-27 DIAGNOSIS — R49.0 DYSPHONIA: ICD-10-CM

## 2024-11-27 DIAGNOSIS — H91.93 UNSPECIFIED HEARING LOSS, BILATERAL: ICD-10-CM

## 2024-11-27 PROCEDURE — 31231 NASAL ENDOSCOPY DX: CPT

## 2024-11-27 PROCEDURE — 69210 REMOVE IMPACTED EAR WAX UNI: CPT

## 2024-11-27 RX ORDER — LOSARTAN POTASSIUM 100 MG/1
TABLET, FILM COATED ORAL
Refills: 0 | Status: ACTIVE | COMMUNITY

## 2024-11-27 RX ORDER — METFORMIN HYDROCHLORIDE 500 MG/1
500 TABLET, COATED ORAL
Refills: 0 | Status: ACTIVE | COMMUNITY

## 2024-12-18 ENCOUNTER — APPOINTMENT (OUTPATIENT)
Dept: OTOLARYNGOLOGY | Facility: CLINIC | Age: 59
End: 2024-12-18